# Patient Record
(demographics unavailable — no encounter records)

---

## 2025-02-26 NOTE — CONSULT LETTER
[Dear  ___] : Dear  [unfilled], [Consult Letter:] : I had the pleasure of evaluating your patient, [unfilled]. [( Thank you for referring [unfilled] for consultation for _____ )] : Thank you for referring [unfilled] for consultation for [unfilled] [Please see my note below.] : Please see my note below. [Consult Closing:] : Thank you very much for allowing me to participate in the care of this patient.  If you have any questions, please do not hesitate to contact me. [Sincerely,] : Sincerely, [FreeTextEntry3] : Luis M Rodriguez MD  Gastroenterology [facility]

## 2025-02-26 NOTE — ASSESSMENT
[FreeTextEntry1] : LILLY CHUN was advised to undergo colonoscopy to which he agreed. The procedure will be performed in [facility]  [facility] with the assistance of an anesthesiologist. The patient was given a Clenpiq  preparation prescription and understood the  procedure as it was explained to his. He was given a booklet distributed by the [employer]  [employer] explaining the procedure in detail and he understood the risks of the procedure not limited to infection, bleeding, perforation or non- diagnosis of colorectal cancer. He was advised that he could not drive home, if he chooses to  receive sedation.  Further diagnostic and treatment recommendations will be based upon the procedure and any biopsies, if they are taken.  Thank you for allowing me to participate in this [facility] care.  , Best personal regards -- Don   I spent 25 minutes with the patient and answered all of his questions and explained colonoscopy in detail

## 2025-02-26 NOTE — HISTORY OF PRESENT ILLNESS
[FreeTextEntry1] : His last colonoscopy was October 4, 2023 in which 2 polyps were removed with one being 25 mm in diameter.  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  He is feeling well and has no complaints.  He denies a family history of colon cancer.